# Patient Record
Sex: MALE | URBAN - METROPOLITAN AREA
[De-identification: names, ages, dates, MRNs, and addresses within clinical notes are randomized per-mention and may not be internally consistent; named-entity substitution may affect disease eponyms.]

---

## 2019-08-05 ENCOUNTER — TELEPHONE (OUTPATIENT)
Dept: GASTROENTEROLOGY | Facility: CLINIC | Age: 54
End: 2019-08-05

## 2025-04-11 ENCOUNTER — EVALUATION (OUTPATIENT)
Dept: PHYSICAL THERAPY | Facility: REHABILITATION | Age: 60
End: 2025-04-11
Payer: COMMERCIAL

## 2025-04-11 DIAGNOSIS — M25.561 ACUTE PAIN OF RIGHT KNEE: Primary | ICD-10-CM

## 2025-04-11 DIAGNOSIS — Z96.651 S/P TOTAL KNEE ARTHROPLASTY, RIGHT: ICD-10-CM

## 2025-04-11 PROCEDURE — 97110 THERAPEUTIC EXERCISES: CPT | Performed by: PHYSICAL THERAPIST

## 2025-04-11 PROCEDURE — 97161 PT EVAL LOW COMPLEX 20 MIN: CPT | Performed by: PHYSICAL THERAPIST

## 2025-04-11 NOTE — PROGRESS NOTES
PT Evaluation     Today's date: 2025  Patient name: Senthil Baker  : 1965  MRN: [id number]  Referring provider: Lc Gauthier DO  Dx:   Encounter Diagnosis     ICD-10-CM    1. Acute pain of right knee  M25.561       2. S/P total knee arthroplasty, right  Z96.651           Start Time: 0900  Stop Time: 0940  Total time in clinic (min): 40 minutes    Assessment  Impairments: abnormal muscle firing, abnormal or restricted ROM, abnormal movement, activity intolerance, impaired physical strength, pain with function, poor body mechanics and activity limitations    Assessment details: Patient is a 59 y.o. male presenting s/p R TKA with date of surgery 25. Resulting postoperative impairments include R knee AROM/PROM deficits, R knee strength deficits, and gait dysfunction. As a result of impairments patient experiences limitations with functional/daily activities including ambulating with SPC, longer distance ambulation, walking dog, lifting, and step to step stair navigation. Patient has the above listed impairments and will benefit from skilled PT to improve deficits to return to prior level of function.       Prognosis: good    Goals  Impairment Goals: 4-6 weeks  - Patient to decrease pain to 0/10  - Patient to improve knee PROM to WFL  - Patient to increase knee strength to 4/5 throughout  - Patient to increase hip strength to 4/5 throughout    Functional Goals: by discharge  - Patient to discharge to independent [facility]  - Patient to improve subjective functional level to 85%  - Patient to improve knee AROM to WFL  - Patient to ambulate in home and community without increased pain or difficulty   - Patient to ascend and descend stairs without increased pain or difficulty  - Patient to complete sit to stand transfers without increased pain or difficulty    Plan  Patient would benefit from: skilled physical therapy  Planned modality interventions: cryotherapy, TENS and thermotherapy: hydrocollator  packs    Planned therapy interventions: flexibility, home exercise program, joint mobilization, manual therapy, neuromuscular re-education, patient education, strengthening, stretching, therapeutic activities, therapeutic exercise and functional ROM exercises    Frequency: 2x week  Duration in weeks: 8  Treatment plan discussed with: patient        Subjective Evaluation    History of Present Illness  Mechanism of injury: HISTORY OF PRESENT ILLNESS: Patient presents s/p R TKA 25. He was recently discharged from home health. He had an ED visit one week after surgery due to ketoacidosis and PE and has been on Lovenox since.   PRIOR TREATMENT: home health  AGGRAVATING FACTORS: twisting  EASING FACTORS: medications  WORK:  (office work)  FUNCTIONAL LIMITATIONS: ambulating with SPC, longer distance ambulation, walking dog, lifting, and step to step stair navigation  SUBJECTIVE FUNCTIONAL LEVEL: 65%  PATIENT GOAL: to get back to normal  Pain  Current pain rating: 3  At best pain rating: 3  At worst pain ratin  Location: R knee          Objective     Active Range of Motion   Left Knee   Flexion: 145 degrees   Extension: 0 degrees     Right Knee   Flexion: 80 degrees   Extension: -12 degrees     Strength/Myotome Testing     Lumbar   Left   Normal strength    Right Hip   Planes of Motion   Flexion: 3+    Right Knee   Flexion: 3- (3+ within ROM)  Extension: 3- (3+ within ROM)  Quadriceps contraction: fair    Right Ankle/Foot   Dorsiflexion: 5    Additional Strength Details  Extension lag with SLR      Flowsheet Rows      Flowsheet Row Most Recent Value   PT/OT G-Codes    Current Score 54   Projected Score 78               Diagnosis: s/p R TKA 25   Precautions: DM, asthma, pacemaker, PE   Primary impairments: R knee AROM/PROM deficits, R knee strength deficits, and gait dysfunction   *asterisks by exercise = given for HEP           Manuals        R knee PROM and tibiofemoral mobilizations       "                                  There Ex        Rec bike        Heel slides *  5\" x 10       Gastroc strap stretch        Supine knee ext prop stretch                                                Neuro Re-Ed        Quad sets *  5\" x 10       SAQ  NV       Supine SLR   X 10       Bridges        S/L hip abd        Prone hip ext        Heel/toe raises        Mini squats        Band TKE        U/L leg press        Fwd/lat step ups        Sidestepping and waddle walks                        Re-evaluation             Ther Act/Gait                                         Modalities             CP prn.                                             "

## 2025-04-11 NOTE — HOME EXERCISE EDUCATION
Program_ID:[id number]   Access Code: [email or web address]  URL: [email or web address]  Date: 04-  Prepared By: Alejandro Clarke    Program Notes      Exercises      - Supine Quad Set - 3 x daily -  x weekly -  sets - 10 reps - 5 hold      - Supine Heel Slide with Strap - 3 x daily -  x weekly -  sets - 10 reps - 5 hold

## 2025-04-11 NOTE — LETTER
2025    Lc Gauthier DO  [address] [facility] [address]    Patient: Senthil Baker   YOB: 1965   Date of Visit: 2025     Encounter Diagnosis     ICD-10-CM    1. Acute pain of right knee  M25.561       2. S/P total knee arthroplasty, right  Z96.651           Dear Dr. Lc Gauthier, DO:    Thank you for your recent referral of Senthil Baker. Please review the attached evaluation summary from Senthil's recent visit.     Please verify that you agree with the plan of care by signing the attached order.     If you have any questions or concerns, please do not hesitate to call.     I sincerely appreciate the opportunity to share in the care of one of your patients and hope to have another opportunity to work with you in the near future.       Sincerely,    Alejandro Clarke, PT      Referring Provider:      I certify that I have read the below Plan of Care and certify the need for these services furnished under this plan of treatment while under my care.                    Lc Gauthier DO  [address] [facility] [address]  Via Fax: [phone]          PT Evaluation     Today's date: 2025  Patient name: Senthil Baker  : 1965  MRN: [id number]  Referring provider: Lc Gauthier DO  Dx:   Encounter Diagnosis     ICD-10-CM    1. Acute pain of right knee  M25.561       2. S/P total knee arthroplasty, right  Z96.651           Start Time: 0900  Stop Time: 0940  Total time in clinic (min): 40 minutes    Assessment  Impairments: abnormal muscle firing, abnormal or restricted ROM, abnormal movement, activity intolerance, impaired physical strength, pain with function, poor body mechanics and activity limitations    Assessment details: Patient is a 59 y.o. male presenting s/p R TKA with date of surgery 25. Resulting postoperative impairments include R knee AROM/PROM deficits, R knee strength deficits, and gait dysfunction. As a result of impairments  patient experiences limitations with functional/daily activities including ambulating with SPC, longer distance ambulation, walking dog, lifting, and step to step stair navigation. Patient has the above listed impairments and will benefit from skilled PT to improve deficits to return to prior level of function.       Prognosis: good    Goals  Impairment Goals: 4-6 weeks  - Patient to decrease pain to 0/10  - Patient to improve knee PROM to WFL  - Patient to increase knee strength to 4/5 throughout  - Patient to increase hip strength to 4/5 throughout    Functional Goals: by discharge  - Patient to discharge to independent [facility]  - Patient to improve subjective functional level to 85%  - Patient to improve knee AROM to WFL  - Patient to ambulate in home and community without increased pain or difficulty   - Patient to ascend and descend stairs without increased pain or difficulty  - Patient to complete sit to stand transfers without increased pain or difficulty    Plan  Patient would benefit from: skilled physical therapy  Planned modality interventions: cryotherapy, TENS and thermotherapy: hydrocollator packs    Planned therapy interventions: flexibility, home exercise program, joint mobilization, manual therapy, neuromuscular re-education, patient education, strengthening, stretching, therapeutic activities, therapeutic exercise and functional ROM exercises    Frequency: 2x week  Duration in weeks: 8  Treatment plan discussed with: patient        Subjective Evaluation    History of Present Illness  Mechanism of injury: HISTORY OF PRESENT ILLNESS: Patient presents s/p R TKA 2/19/25. He was recently discharged from home health. He had an ED visit one week after surgery due to ketoacidosis and PE and has been on Lovenox since.   PRIOR TREATMENT: home health  AGGRAVATING FACTORS: twisting  EASING FACTORS: medications  WORK:  (office work)  FUNCTIONAL LIMITATIONS: ambulating with SPC, longer distance  "ambulation, walking dog, lifting, and step to step stair navigation  SUBJECTIVE FUNCTIONAL LEVEL: 65%  PATIENT GOAL: to get back to normal  Pain  Current pain rating: 3  At best pain rating: 3  At worst pain ratin  Location: R knee          Objective     Active Range of Motion   Left Knee   Flexion: 145 degrees   Extension: 0 degrees     Right Knee   Flexion: 80 degrees   Extension: -12 degrees     Strength/Myotome Testing     Lumbar   Left   Normal strength    Right Hip   Planes of Motion   Flexion: 3+    Right Knee   Flexion: 3- (3+ within ROM)  Extension: 3- (3+ within ROM)  Quadriceps contraction: fair    Right Ankle/Foot   Dorsiflexion: 5    Additional Strength Details  Extension lag with SLR      Flowsheet Rows      Flowsheet Row Most Recent Value   PT/OT G-Codes    Current Score 54   Projected Score 78               Diagnosis: s/p R TKA 25   Precautions: DM, asthma, pacemaker, PE   Primary impairments: R knee AROM/PROM deficits, R knee strength deficits, and gait dysfunction   *asterisks by exercise = given for HEP           Manuals        R knee PROM and tibiofemoral mobilizations                                        There Ex        Rec bike        Heel slides *  5\" x 10       Gastroc strap stretch        Supine knee ext prop stretch                                                Neuro Re-Ed        Quad sets *  5\" x 10       SAQ  NV       Supine SLR   X 10       Bridges        S/L hip abd        Prone hip ext        Heel/toe raises        Mini squats        Band TKE        U/L leg press        Fwd/lat step ups        Sidestepping and waddle walks                        Re-evaluation             Ther Act/Gait                                         Modalities             CP prn.                                                             "

## 2025-04-11 NOTE — HOME EXERCISE EDUCATION
Program_ID:[id number]   Access Code: [email or web address]  URL: [email or web address]  Date: 04-  Prepared By: Alejandro Clarke    Program Notes      Exercises      - Supine Quad Set - 3 x daily -  x weekly -  sets - 10 reps - 5 hold      - Supine Heel Slide with Strap - 3 x daily -  x weekly -  sets - 10 reps - 5 hold      - Active Straight Leg Raise with Quad Set - 1 x daily -  x weekly -  sets - 10 reps

## 2025-04-11 NOTE — HOME EXERCISE EDUCATION
Program_ID:[id number]   Access Code: [email or web address]  URL: [email or web address]  Date: 04-  Prepared By: Alejandro Clarke    Program Notes      Exercises      - Supine Quad Set - 3 x daily -  x weekly -  sets - 10 reps - 5 hold      - Supine Heel Slide with Strap - 3 x daily -  x weekly -  sets - 10 reps - 5 hold

## 2025-04-11 NOTE — HOME EXERCISE EDUCATION
Program_ID:[id number]   Access Code: [email or web address]  URL: [email or web address]  Date: 04-  Prepared By: Alejandro Clarke    Program Notes      Exercises      - Supine Quad Set - 3 x daily -  x weekly -  sets - 10 reps - 5 hold      - Supine Heel Slide with Strap - 3 x daily -  x weekly -  sets - 10 reps - 5 hold      - Active Straight Leg Raise with Quad Set - 1 x daily -  x weekly -  sets - 10 reps

## 2025-04-16 ENCOUNTER — OFFICE VISIT (OUTPATIENT)
Dept: PHYSICAL THERAPY | Facility: REHABILITATION | Age: 60
End: 2025-04-16
Payer: COMMERCIAL

## 2025-04-16 DIAGNOSIS — M25.561 ACUTE PAIN OF RIGHT KNEE: Primary | ICD-10-CM

## 2025-04-16 DIAGNOSIS — Z96.651 S/P TOTAL KNEE ARTHROPLASTY, RIGHT: ICD-10-CM

## 2025-04-16 PROCEDURE — 97112 NEUROMUSCULAR REEDUCATION: CPT | Performed by: PHYSICAL THERAPIST

## 2025-04-16 PROCEDURE — 97110 THERAPEUTIC EXERCISES: CPT | Performed by: PHYSICAL THERAPIST

## 2025-04-16 PROCEDURE — 97140 MANUAL THERAPY 1/> REGIONS: CPT | Performed by: PHYSICAL THERAPIST

## 2025-04-18 ENCOUNTER — OFFICE VISIT (OUTPATIENT)
Dept: PHYSICAL THERAPY | Facility: REHABILITATION | Age: 60
End: 2025-04-18
Payer: COMMERCIAL

## 2025-04-18 DIAGNOSIS — Z96.651 S/P TOTAL KNEE ARTHROPLASTY, RIGHT: ICD-10-CM

## 2025-04-18 DIAGNOSIS — M25.561 ACUTE PAIN OF RIGHT KNEE: Primary | ICD-10-CM

## 2025-04-18 PROCEDURE — 97112 NEUROMUSCULAR REEDUCATION: CPT | Performed by: PHYSICAL THERAPIST

## 2025-04-18 PROCEDURE — 97140 MANUAL THERAPY 1/> REGIONS: CPT | Performed by: PHYSICAL THERAPIST

## 2025-04-18 PROCEDURE — 97110 THERAPEUTIC EXERCISES: CPT | Performed by: PHYSICAL THERAPIST

## 2025-04-18 NOTE — PROGRESS NOTES
"Daily Note     Today's date: 2025  Patient name: Senthil Baker  : 1965  MRN: [id number]  Referring provider: Lc Gauthier DO  Dx:   Encounter Diagnosis     ICD-10-CM    1. Acute pain of right knee  M25.561       2. S/P total knee arthroplasty, right  Z96.651           Start Time: 1315  Stop Time: 1410  Total time in clinic (min): 55 minutes    Subjective: Patient reports he felt good after last visit however is sore on arrival from walking his dog about 4 blocks yesterday      Objective: See treatment diary below      Assessment: Tolerated treatment well. Positive response to manual therapy with improving range throughout duration. Deficits remain in knee flexion and extension ROM however improving with interventions. Good quadriceps recruitment during mini squats and standing terminal knee extensions with band resistance. Patient demonstrated fatigue post treatment, exhibited good technique with therapeutic exercises, and would benefit from continued PT      Plan: Continue per plan of care.        Diagnosis: s/p R TKA 25   Precautions: DM, asthma, pacemaker, PE   Primary impairments: R knee AROM/PROM deficits, R knee strength deficits, and gait dysfunction   *asterisks by exercise = given for HEP         Manuals        R knee PROM and tibiofemoral mobilizations   15'  15'                                     There Ex        Rec bike   1/2 revs x 8'  1/2 revs x 8'     Heel slides *  5\" x 10  5\" x 10  5\" x 10     Gastroc strap stretch        Supine knee ext prop stretch                                                Neuro Re-Ed        Quad sets *  5\" x 10  5\" x 10      SAQ  NV  5\" x 15  5\" x 15     Supine SLR   X 10       Bridges        S/L hip abd        Prone hip ext        Heel/toe raises   X 15 ea  X 15 ea     Mini squats   X 15  X 15     Band TKE   Blue 5\" x 10  Blue 5\" x 10     U/L leg press        Fwd/lat step ups        Sidestepping and waddle walks                      "   Re-evaluation             Ther Act/Gait                                         Modalities             CP prn.   10'  10'

## 2025-04-22 ENCOUNTER — OFFICE VISIT (OUTPATIENT)
Dept: PHYSICAL THERAPY | Facility: REHABILITATION | Age: 60
End: 2025-04-22
Attending: ORTHOPAEDIC SURGERY
Payer: COMMERCIAL

## 2025-04-22 DIAGNOSIS — M25.561 ACUTE PAIN OF RIGHT KNEE: Primary | ICD-10-CM

## 2025-04-22 DIAGNOSIS — Z96.651 S/P TOTAL KNEE ARTHROPLASTY, RIGHT: ICD-10-CM

## 2025-04-22 PROCEDURE — 97112 NEUROMUSCULAR REEDUCATION: CPT | Performed by: PHYSICAL THERAPIST

## 2025-04-22 PROCEDURE — 97140 MANUAL THERAPY 1/> REGIONS: CPT | Performed by: PHYSICAL THERAPIST

## 2025-04-22 PROCEDURE — 97110 THERAPEUTIC EXERCISES: CPT | Performed by: PHYSICAL THERAPIST

## 2025-04-22 NOTE — PROGRESS NOTES
"Daily Note     Today's date: 2025  Patient name: Senthil Baker  : 1965  MRN: [id number]  Referring provider: Lc Gauthier DO  Dx:   Encounter Diagnosis     ICD-10-CM    1. Acute pain of right knee  M25.561       2. S/P total knee arthroplasty, right  Z96.651           Start Time: 0948  Stop Time: 1040  Total time in clinic (min): 52 minutes    Subjective: Patient reports he felt good after last visit but has swelling and stiffness on arrival      Objective: See treatment diary below      Assessment: Tolerated treatment well. Modified squats to sit to stand for improved mechanics with cueing to maintain symmetrical weight distribution. Most significant ROM limitations into knee flexion. Patient demonstrated fatigue post treatment, exhibited good technique with therapeutic exercises, and would benefit from continued PT      Plan: Continue per plan of care.        Diagnosis: s/p R TKA 25   Precautions: DM, asthma, pacemaker, PE   Primary impairments: R knee AROM/PROM deficits, R knee strength deficits, and gait dysfunction   *asterisks by exercise = given for HEP        Manuals        R knee PROM and tibiofemoral mobilizations   15'  15'  15'                                    There Ex        Rec bike   1/2 revs x 8'  1/2 revs x 8'  1/2 revs x 8'    Heel slides *  5\" x 10  5\" x 10  5\" x 10  5\" x 10    Gastroc strap stretch        Supine knee ext prop stretch                                                Neuro Re-Ed        Quad sets *  5\" x 10  5\" x 10      SAQ  NV  5\" x 15  5\" x 15  5\" x 10    Supine SLR   X 10       Bridges        S/L hip abd        Prone hip ext        Heel/toe raises   X 15 ea  X 15 ea  X 15 ea    Sit to stand     Foam x 10    Band TKE   Blue 5\" x 10  Blue 5\" x 10  Blue 5\" x 10    U/L leg press        Fwd/lat step ups     1R x 10 ea    Sidestepping and waddle walks                        Re-evaluation             Ther Act/Gait                                 "         Modalities             CP prn.   10'  10'  10'

## 2025-04-24 ENCOUNTER — OFFICE VISIT (OUTPATIENT)
Dept: PHYSICAL THERAPY | Facility: REHABILITATION | Age: 60
End: 2025-04-24
Attending: ORTHOPAEDIC SURGERY
Payer: COMMERCIAL

## 2025-04-24 DIAGNOSIS — M25.561 ACUTE PAIN OF RIGHT KNEE: Primary | ICD-10-CM

## 2025-04-24 DIAGNOSIS — Z96.651 S/P TOTAL KNEE ARTHROPLASTY, RIGHT: ICD-10-CM

## 2025-04-24 PROCEDURE — 97140 MANUAL THERAPY 1/> REGIONS: CPT | Performed by: PHYSICAL THERAPIST

## 2025-04-24 PROCEDURE — 97110 THERAPEUTIC EXERCISES: CPT | Performed by: PHYSICAL THERAPIST

## 2025-04-24 PROCEDURE — 97112 NEUROMUSCULAR REEDUCATION: CPT | Performed by: PHYSICAL THERAPIST

## 2025-04-24 NOTE — PROGRESS NOTES
"Daily Note     Today's date: 2025  Patient name: Senthil Baker  : 1965  MRN: [id number]  Referring provider: Lc Gauthier DO  Dx:   Encounter Diagnosis     ICD-10-CM    1. Acute pain of right knee  M25.561       2. S/P total knee arthroplasty, right  Z96.651           Start Time: 0900  Stop Time: 1000  Total time in clinic (min): 60 minutes    Subjective: Patient reports he cut the grass on Tuesday which took him 4.5 hours and he was very tired the next day      Objective: See treatment diary below      Assessment: Tolerated treatment well. Initiated terminal leg press into ball and increased hold time with short-arc quadriceps. Improving knee ROM with interventions however most significant limitations remain into flexion. Cueing to minimize hip compensation during band-resisted terminal knee extension in standing. Patient demonstrated fatigue post treatment, exhibited good technique with therapeutic exercises, and would benefit from continued PT      Plan: Continue per plan of care.        Diagnosis: s/p R TKA 25   Precautions: DM, asthma, pacemaker, PE   Primary impairments: R knee AROM/PROM deficits, R knee strength deficits, and gait dysfunction   *asterisks by exercise = given for HEP       Manuals        R knee PROM and tibiofemoral mobilizations   15'  15'  15'  15'                                   There Ex        Rec bike   1/2 revs x 8'  1/2 revs x 8'  1/2 revs x 8'  1/2 revs x 8'   Heel slides *  5\" x 10  5\" x 10  5\" x 10  5\" x 10  5\" x 10   Gastroc strap stretch        Supine knee ext prop stretch                                                Neuro Re-Ed        Quad sets *  5\" x 10  5\" x 10      SAQ  NV  5\" x 15  5\" x 15  5\" x 10  10\" x 10   Supine SLR   X 10       Bridges        S/L hip abd      X 15   Prone hip ext        Sit to stand     Foam x 10  Foam x 10   Band TKE   Blue 5\" x 10  Blue 5\" x 10  Blue 5\" x 10  Blue 5\" x 10   U/L leg press        Ball " "terminal leg press      5\" x 10   Fwd/lat step ups     1R x 10 ea  1R x 10 ea   Sidestepping and waddle walks                        Re-evaluation             Ther Act/Gait                                         Modalities             CP prn.   10'  10'  10'  10'                                              "

## 2025-04-29 ENCOUNTER — OFFICE VISIT (OUTPATIENT)
Dept: PHYSICAL THERAPY | Facility: REHABILITATION | Age: 60
End: 2025-04-29
Attending: ORTHOPAEDIC SURGERY
Payer: COMMERCIAL

## 2025-04-29 DIAGNOSIS — Z96.651 S/P TOTAL KNEE ARTHROPLASTY, RIGHT: ICD-10-CM

## 2025-04-29 DIAGNOSIS — M25.561 ACUTE PAIN OF RIGHT KNEE: Primary | ICD-10-CM

## 2025-04-29 PROCEDURE — 97140 MANUAL THERAPY 1/> REGIONS: CPT | Performed by: PHYSICAL THERAPIST

## 2025-04-29 PROCEDURE — 97112 NEUROMUSCULAR REEDUCATION: CPT | Performed by: PHYSICAL THERAPIST

## 2025-04-29 PROCEDURE — 97110 THERAPEUTIC EXERCISES: CPT | Performed by: PHYSICAL THERAPIST

## 2025-04-29 NOTE — PROGRESS NOTES
"Daily Note     Today's date: 2025  Patient name: Senthil Baker  : 1965  MRN: [id number]  Referring provider: Lc Gauthier DO  Dx:   Encounter Diagnosis     ICD-10-CM    1. Acute pain of right knee  M25.561       2. S/P total knee arthroplasty, right  Z96.651           Start Time: 0900  Stop Time: 0955  Total time in clinic (min): 55 minutes    Subjective: Patient reports he is stiff on arrival and he felt okay after last visit      Objective: See treatment diary below      Assessment: Tolerated treatment well. Continued mobility deficits into flexion and extension with more significant limitations into flexion. Initiated unilateral leg press with no adverse responses. Symmetrical weight distribution observed during sit to stand. Patient demonstrated fatigue post treatment, exhibited good technique with therapeutic exercises, and would benefit from continued PT      Plan: Continue per plan of care.        Diagnosis: s/p R TKA 25   Precautions: DM, asthma, pacemaker, PE   Primary impairments: R knee AROM/PROM deficits, R knee strength deficits, and gait dysfunction   *asterisks by exercise = given for HEP       Manuals        R knee PROM and tibiofemoral mobilizations  15'  15'  15'  15'  15'   Prone knee flexion  3'                               There Ex        Rec bike  1/2 revs x 8'  1/2 revs x 8'  1/2 revs x 8'  1/2 revs x 8'  1/2 revs x 8'   Heel slides *  5\" x 10  5\" x 10  5\" x 10  5\" x 10  5\" x 10   Gastroc strap stretch        Supine knee ext prop stretch                                                Neuro Re-Ed        Quad sets *   5\" x 10      SAQ   5\" x 15  5\" x 15  5\" x 10  10\" x 10   Supine SLR         Bridges        S/L hip abd      X 15   Prone hip ext        Sit to stand  Foam x 10    Foam x 10  Foam x 10   Band TKE  D/C  Blue 5\" x 10  Blue 5\" x 10  Blue 5\" x 10  Blue 5\" x 10   U/L leg press  45 lbs x 15       Ball terminal leg press  5\" x 15     5\" x 10 " I reviewed the H&P, I examined the patient, and there are no changes in the patient's condition.   Fwd/lat step ups  1R x 10 ea    1R x 10 ea  1R x 10 ea   Sidestepping and waddle walks                        Re-evaluation            Ther Act/Gait                                      Modalities            CP prn.  10'  10'  10'  10'  10'                                               Patient

## 2025-05-01 ENCOUNTER — OFFICE VISIT (OUTPATIENT)
Dept: PHYSICAL THERAPY | Facility: REHABILITATION | Age: 60
End: 2025-05-01
Attending: ORTHOPAEDIC SURGERY
Payer: COMMERCIAL

## 2025-05-01 DIAGNOSIS — Z96.651 S/P TOTAL KNEE ARTHROPLASTY, RIGHT: ICD-10-CM

## 2025-05-01 DIAGNOSIS — M25.561 ACUTE PAIN OF RIGHT KNEE: Primary | ICD-10-CM

## 2025-05-01 PROCEDURE — 97110 THERAPEUTIC EXERCISES: CPT | Performed by: PHYSICAL THERAPIST

## 2025-05-01 PROCEDURE — 97112 NEUROMUSCULAR REEDUCATION: CPT | Performed by: PHYSICAL THERAPIST

## 2025-05-01 PROCEDURE — 97140 MANUAL THERAPY 1/> REGIONS: CPT | Performed by: PHYSICAL THERAPIST

## 2025-05-01 NOTE — PROGRESS NOTES
"Daily Note     Today's date: 2025  Patient name: Senthil Baker  : 1965  MRN: [id number]  Referring provider: Lc Gauthier DO  Dx:   Encounter Diagnosis     ICD-10-CM    1. Acute pain of right knee  M25.561       2. S/P total knee arthroplasty, right  Z96.651           Start Time: 0815  Stop Time: 0910  Total time in clinic (min): 55 minutes    Subjective: Patient walked his dog 3 blocks yesterday. He felt okay but walked slowly and he wishes his knee would loosen up      Objective: See treatment diary below      Assessment: Tolerated treatment well. Continued deficits in knee flexion however gradually improving with interventions. Increasing ROM available during terminal leg press into ball. Patient demonstrated fatigue post treatment, exhibited good technique with therapeutic exercises, and would benefit from continued PT      Plan: Continue per plan of care.        Diagnosis: s/p R TKA 25   Precautions: DM, asthma, pacemaker, PE   Primary impairments: R knee AROM/PROM deficits, R knee strength deficits, and gait dysfunction   *asterisks by exercise = given for HEP       Manuals        R knee PROM and tibiofemoral mobilizations  15'  15'  15'  15'  15'   Prone knee flexion  3'  3'                              There Ex        Rec bike  1/2 revs x 8'  1/2 revs x 8'  1/2 revs x 8'  1/2 revs x 8'  1/2 revs x 8'   Heel slides *  5\" x 10   5\" x 10  5\" x 10  5\" x 10   Gastroc strap stretch        Supine knee ext prop stretch                                                Neuro Re-Ed        Quad sets *        SAQ    5\" x 15  5\" x 10  10\" x 10   Supine SLR         Bridges        S/L hip abd      X 15   Prone hip ext        Sit to stand  Foam x 10  Foam x 10   Foam x 10  Foam x 10   U/L leg press  45 lbs x 15  45 lbs x 15      Ball terminal leg press  5\" x 15  5\" x 15    5\" x 10   Fwd/lat step ups  1R x 10 ea  1R x 12 ea   1R x 10 ea  1R x 10 ea   Sidestepping and waddle walks      "                   Re-evaluation           Ther Act/Gait                                   Modalities           CP prn.  10'  10'  10'  10'  10'

## 2025-05-05 ENCOUNTER — OFFICE VISIT (OUTPATIENT)
Dept: PHYSICAL THERAPY | Facility: REHABILITATION | Age: 60
End: 2025-05-05
Attending: ORTHOPAEDIC SURGERY
Payer: COMMERCIAL

## 2025-05-05 DIAGNOSIS — Z96.651 S/P TOTAL KNEE ARTHROPLASTY, RIGHT: ICD-10-CM

## 2025-05-05 DIAGNOSIS — M25.561 ACUTE PAIN OF RIGHT KNEE: Primary | ICD-10-CM

## 2025-05-05 PROCEDURE — 97110 THERAPEUTIC EXERCISES: CPT | Performed by: PHYSICAL THERAPIST

## 2025-05-05 PROCEDURE — 97112 NEUROMUSCULAR REEDUCATION: CPT | Performed by: PHYSICAL THERAPIST

## 2025-05-05 PROCEDURE — 97140 MANUAL THERAPY 1/> REGIONS: CPT | Performed by: PHYSICAL THERAPIST

## 2025-05-05 NOTE — PROGRESS NOTES
"Daily Note     Today's date: 2025  Patient name: Senthil Baker  : 1965  MRN: [id number]  Referring provider: Lc Gauthier DO  Dx:   Encounter Diagnosis     ICD-10-CM    1. Acute pain of right knee  M25.561       2. S/P total knee arthroplasty, right  Z96.651           Start Time: 0815  Stop Time: 0910  Total time in clinic (min): 55 minutes    Subjective: Patient reports he was able to walk to treatment today which is about 10 minutes      Objective: See treatment diary below      Assessment: Tolerated treatment well. Increased resistance with unilateral leg press with no adverse responses. Continued limitations in knee ROM most notably into flexion. Patient demonstrated fatigue post treatment, exhibited good technique with therapeutic exercises, and would benefit from continued PT      Plan: Continue per plan of care.        Diagnosis: s/p R TKA 25   Precautions: DM, asthma, pacemaker, PE   Primary impairments: R knee AROM/PROM deficits, R knee strength deficits, and gait dysfunction   *asterisks by exercise = given for HEP       Manuals        R knee PROM and tibiofemoral mobilizations  15'  15'  15'  15'  15'   Prone knee flexion  3'  3'  3'                             There Ex        Rec bike  1/2 revs x 8'  1/2 revs x 8'  1/2 revs x 8'  1/2 revs x 8'  1/2 revs x 8'   Heel slides *  5\" x 10    5\" x 10  5\" x 10   Gastroc strap stretch        Supine knee ext prop stretch                                                Neuro Re-Ed        Quad sets *        SAQ     5\" x 10  10\" x 10   Supine SLR         Bridges        S/L hip abd      X 15   Prone hip ext        Sit to stand  Foam x 10  Foam x 10  Foam x 15  Foam x 10  Foam x 10   U/L leg press  45 lbs x 15  45 lbs x 15  55 lbs x 15     Ball terminal leg press  5\" x 15  5\" x 15  5\" x 15   5\" x 10   Fwd/lat step ups  1R x 10 ea  1R x 12 ea  1R x 15 ea  1R x 10 ea  1R x 10 ea   Sidestepping and waddle walks                     "    Re-evaluation           Ther Act/Gait                                   Modalities           CP prn.  10'  10'  10'  10'  10'

## 2025-05-07 ENCOUNTER — EVALUATION (OUTPATIENT)
Dept: PHYSICAL THERAPY | Facility: REHABILITATION | Age: 60
End: 2025-05-07
Attending: ORTHOPAEDIC SURGERY
Payer: COMMERCIAL

## 2025-05-07 DIAGNOSIS — M25.561 ACUTE PAIN OF RIGHT KNEE: Primary | ICD-10-CM

## 2025-05-07 DIAGNOSIS — Z96.651 S/P TOTAL KNEE ARTHROPLASTY, RIGHT: ICD-10-CM

## 2025-05-07 PROCEDURE — 97112 NEUROMUSCULAR REEDUCATION: CPT | Performed by: PHYSICAL THERAPIST

## 2025-05-07 PROCEDURE — 97110 THERAPEUTIC EXERCISES: CPT | Performed by: PHYSICAL THERAPIST

## 2025-05-07 NOTE — PROGRESS NOTES
PT Re-Evaluation     Today's date: 2025  Patient name: Senthil Baker  : 1965  MRN: [id number]  Referring provider: Lc Gauthier DO  Dx:   Encounter Diagnosis     ICD-10-CM    1. Acute pain of right knee  M25.561       2. S/P total knee arthroplasty, right  Z96.651           Start Time: 0815  Stop Time: 09  Total time in clinic (min): 50 minutes    Assessment  Impairments: abnormal muscle firing, abnormal or restricted ROM, abnormal movement, activity intolerance, impaired physical strength, pain with function, poor body mechanics and activity limitations    Assessment details: Patient is a 59 y.o. male presenting s/p R TKA with date of surgery 25. Patient exhibits fair progress toward objective and functional goals at time of reexamination. Patient exhibits improvements with ambulating without AD in home and short distances, walking dog, knee ROM, knee strength, and weightbearing tolerance since initiating PT however continues to have limitations compared to prior level of function. Remaining functional limitations include ambulating with SPC for longer distances, longer distance ambulation, lifting, and step to step stair navigation. As a result of impairments patient has continued limitations with daily and functional activities and would benefit from continued skilled PT interventions to address these impairments in order to maximize function.          Prognosis: good    Goals  Impairment Goals: 4-6 weeks  - Patient to decrease pain to 0/10 - MET  - Patient to improve knee PROM to WFL - PROGRESSING  - Patient to increase knee strength to 4/5 throughout - PROGRESSING  - Patient to increase hip strength to 4/5 throughout - PROGRESSING    Functional Goals: by discharge  - Patient to discharge to independent [facility] - PROGRESSING  - Patient to improve subjective functional level to 85% - PROGRESSING  - Patient to improve knee AROM to WFL - PROGRESSING  - Patient to ambulate in home and community  without increased pain or difficulty - PROGRESSING  - Patient to ascend and descend stairs without increased pain or difficulty - PROGRESSING  - Patient to complete sit to stand transfers without increased pain or difficulty - PROGRESSING    Plan  Patient would benefit from: skilled physical therapy  Planned modality interventions: cryotherapy, TENS and thermotherapy: hydrocollator packs    Planned therapy interventions: flexibility, home exercise program, joint mobilization, manual therapy, neuromuscular re-education, patient education, strengthening, stretching, therapeutic activities, therapeutic exercise and functional ROM exercises    Frequency: 2x week  Duration in weeks: 8  Treatment plan discussed with: patient        Subjective Evaluation    History of Present Illness  Mechanism of injury: HISTORY OF PRESENT ILLNESS: Patient presents s/p R TKA 25. He reports he is improving slowly with interventions. He continues to have tightness into knee flexion. He is getting more comfortable with seated and lying positioning and has more confidence with weightbearing. He is ambulating without AD in home and short distances and is ambulating with SPC for longer distances.   PRIOR TREATMENT: home health  AGGRAVATING FACTORS: general soreness  EASING FACTORS: medications  WORK:  (office work)  FUNCTIONAL LIMITATIONS: ambulating with SPC for longer distances, longer distance ambulation, lifting, and step to step stair navigation  IMPROVEMENTS: ambulating without AD in home and short distances, walking dog, knee ROM, knee strength, and weightbearing tolerance  SUBJECTIVE FUNCTIONAL LEVEL: 75%  PATIENT GOAL: to get back to normal  Pain  Current pain ratin  At best pain ratin  At worst pain ratin  Location: R knee          Objective     Active Range of Motion   Left Knee   Flexion: 145 degrees   Extension: 0 degrees     Right Knee   Flexion: 82 degrees   Extension: -5 degrees     Passive Range  "of Motion     Right Knee   Flexion: 82 degrees   Extension: -2 degrees     Strength/Myotome Testing     Lumbar   Left   Normal strength    Right Hip   Planes of Motion   Flexion: 4-    Right Knee   Flexion: 3- (4 within ROM)  Extension: 3- (4 within ROM)  Quadriceps contraction: fair    Right Ankle/Foot   Dorsiflexion: 5    Additional Strength Details  R SLR: 3+  L SLR: 4  Extension lag with SLR               Diagnosis: s/p R TKA 2/19/25   Precautions: DM, asthma, pacemaker, PE   Primary impairments: R knee AROM/PROM deficits, R knee strength deficits, and gait dysfunction   *asterisks by exercise = given for HEP    4/29 5/1 5/5 5/7 4/24   Manuals        R knee PROM and tibiofemoral mobilizations  15'  15'  15'   15'   Prone knee flexion  3'  3'  3'                             There Ex        Rec bike  1/2 revs x 8'  1/2 revs x 8'  1/2 revs x 8'  1/2 revs x 8'  1/2 revs x 8'   Heel slides *  5\" x 10     5\" x 10   Gastroc strap stretch        Supine knee ext prop stretch                                                Neuro Re-Ed        Quad sets *        SAQ      10\" x 10   Supine SLR         Bridges        S/L hip abd      X 15   Prone hip ext        Sit to stand  Foam x 10  Foam x 10  Foam x 15   Foam x 10   U/L leg press  45 lbs x 15  45 lbs x 15  55 lbs x 15     Ball terminal leg press  5\" x 15  5\" x 15  5\" x 15   5\" x 10   Fwd/lat step ups  1R x 10 ea  1R x 12 ea  1R x 15 ea   1R x 10 ea   Sidestepping and waddle walks                        Re-evaluation      CM     Ther Act/Gait                                Modalities           CP prn.  10'  10'  10'  10'  10'                                      "

## 2025-05-07 NOTE — LETTER
May 7, 2025    Lc Gauthier DO  [address] [facility] [address]    Patient: Senthil Baker   YOB: 1965   Date of Visit: 2025     Encounter Diagnosis     ICD-10-CM    1. Acute pain of right knee  M25.561       2. S/P total knee arthroplasty, right  Z96.651           Dear Dr. Lc Gauthier, DO:    Thank you for your recent referral of Senthil Baker. Please review the attached evaluation summary from Senthil's recent visit.     Please verify that you agree with the plan of care by signing the attached order.     If you have any questions or concerns, please do not hesitate to call.     I sincerely appreciate the opportunity to share in the care of one of your patients and hope to have another opportunity to work with you in the near future.       Sincerely,    Alejandro Clarke, PT      Referring Provider:      I certify that I have read the below Plan of Care and certify the need for these services furnished under this plan of treatment while under my care.                    Lc Gauthier DO  [address] [facility] [address]  Via Fax: [phone]          PT Re-Evaluation     Today's date: 2025  Patient name: Senthil Baker  : 1965  MRN: [id number]  Referring provider: Lc Gauthier DO  Dx:   Encounter Diagnosis     ICD-10-CM    1. Acute pain of right knee  M25.561       2. S/P total knee arthroplasty, right  Z96.651           Start Time: 0815  Stop Time: 09  Total time in clinic (min): 50 minutes    Assessment  Impairments: abnormal muscle firing, abnormal or restricted ROM, abnormal movement, activity intolerance, impaired physical strength, pain with function, poor body mechanics and activity limitations    Assessment details: Patient is a 59 y.o. male presenting s/p R TKA with date of surgery 25. Patient exhibits fair progress toward objective and functional goals at time of reexamination. Patient exhibits improvements with ambulating without AD  in home and short distances, walking dog, knee ROM, knee strength, and weightbearing tolerance since initiating PT however continues to have limitations compared to prior level of function. Remaining functional limitations include ambulating with SPC for longer distances, longer distance ambulation, lifting, and step to step stair navigation. As a result of impairments patient has continued limitations with daily and functional activities and would benefit from continued skilled PT interventions to address these impairments in order to maximize function.          Prognosis: good    Goals  Impairment Goals: 4-6 weeks  - Patient to decrease pain to 0/10 - MET  - Patient to improve knee PROM to WFL - PROGRESSING  - Patient to increase knee strength to 4/5 throughout - PROGRESSING  - Patient to increase hip strength to 4/5 throughout - PROGRESSING    Functional Goals: by discharge  - Patient to discharge to independent [facility] - PROGRESSING  - Patient to improve subjective functional level to 85% - PROGRESSING  - Patient to improve knee AROM to WFL - PROGRESSING  - Patient to ambulate in home and community without increased pain or difficulty - PROGRESSING  - Patient to ascend and descend stairs without increased pain or difficulty - PROGRESSING  - Patient to complete sit to stand transfers without increased pain or difficulty - PROGRESSING    Plan  Patient would benefit from: skilled physical therapy  Planned modality interventions: cryotherapy, TENS and thermotherapy: hydrocollator packs    Planned therapy interventions: flexibility, home exercise program, joint mobilization, manual therapy, neuromuscular re-education, patient education, strengthening, stretching, therapeutic activities, therapeutic exercise and functional ROM exercises    Frequency: 2x week  Duration in weeks: 8  Treatment plan discussed with: patient        Subjective Evaluation    History of Present Illness  Mechanism of injury: HISTORY OF PRESENT  ILLNESS: Patient presents s/p R TKA 25. He reports he is improving slowly with interventions. He continues to have tightness into knee flexion. He is getting more comfortable with seated and lying positioning and has more confidence with weightbearing. He is ambulating without AD in home and short distances and is ambulating with SPC for longer distances.   PRIOR TREATMENT: home health  AGGRAVATING FACTORS: general soreness  EASING FACTORS: medications  WORK:  (office work)  FUNCTIONAL LIMITATIONS: ambulating with SPC for longer distances, longer distance ambulation, lifting, and step to step stair navigation  IMPROVEMENTS: ambulating without AD in home and short distances, walking dog, knee ROM, knee strength, and weightbearing tolerance  SUBJECTIVE FUNCTIONAL LEVEL: 75%  PATIENT GOAL: to get back to normal  Pain  Current pain ratin  At best pain ratin  At worst pain ratin  Location: R knee          Objective     Active Range of Motion   Left Knee   Flexion: 145 degrees   Extension: 0 degrees     Right Knee   Flexion: 82 degrees   Extension: -5 degrees     Passive Range of Motion     Right Knee   Flexion: 82 degrees   Extension: -2 degrees     Strength/Myotome Testing     Lumbar   Left   Normal strength    Right Hip   Planes of Motion   Flexion: 4-    Right Knee   Flexion: 3- (4 within ROM)  Extension: 3- (4 within ROM)  Quadriceps contraction: fair    Right Ankle/Foot   Dorsiflexion: 5    Additional Strength Details  R SLR: 3+  L SLR: 4  Extension lag with SLR               Diagnosis: s/p R TKA 25   Precautions: DM, asthma, pacemaker, PE   Primary impairments: R knee AROM/PROM deficits, R knee strength deficits, and gait dysfunction   *asterisks by exercise = given for HEP       Manuals        R knee PROM and tibiofemoral mobilizations  15'  15'  15'   15'   Prone knee flexion  3'  3'  3'                             There Ex        Rec bike   revs x 8'   "1/2 revs x 8'  1/2 revs x 8'  1/2 revs x 8'  1/2 revs x 8'   Heel slides *  5\" x 10     5\" x 10   Gastroc strap stretch        Supine knee ext prop stretch                                                Neuro Re-Ed        Quad sets *        SAQ      10\" x 10   Supine SLR         Bridges        S/L hip abd      X 15   Prone hip ext        Sit to stand  Foam x 10  Foam x 10  Foam x 15   Foam x 10   U/L leg press  45 lbs x 15  45 lbs x 15  55 lbs x 15     Ball terminal leg press  5\" x 15  5\" x 15  5\" x 15   5\" x 10   Fwd/lat step ups  1R x 10 ea  1R x 12 ea  1R x 15 ea   1R x 10 ea   Sidestepping and waddle walks                        Re-evaluation      CM     Ther Act/Gait                                Modalities           CP prn.  10'  10'  10'  10'  10'                                                      "

## 2025-05-12 ENCOUNTER — OFFICE VISIT (OUTPATIENT)
Dept: PHYSICAL THERAPY | Facility: REHABILITATION | Age: 60
End: 2025-05-12
Attending: ORTHOPAEDIC SURGERY
Payer: COMMERCIAL

## 2025-05-12 DIAGNOSIS — Z96.651 S/P TOTAL KNEE ARTHROPLASTY, RIGHT: ICD-10-CM

## 2025-05-12 DIAGNOSIS — M25.561 ACUTE PAIN OF RIGHT KNEE: Primary | ICD-10-CM

## 2025-05-12 PROCEDURE — 97110 THERAPEUTIC EXERCISES: CPT

## 2025-05-12 PROCEDURE — 97140 MANUAL THERAPY 1/> REGIONS: CPT

## 2025-05-12 PROCEDURE — 97112 NEUROMUSCULAR REEDUCATION: CPT

## 2025-05-12 NOTE — PROGRESS NOTES
"Daily Note     Today's date: 2025  Patient name: Senthil Baker  : 1965  MRN: [id number]  Referring provider: Lc Gauthier DO  Dx:   Encounter Diagnosis     ICD-10-CM    1. Acute pain of right knee  M25.561       2. S/P total knee arthroplasty, right  Z96.651                      Subjective:   Patient reports that his knee feels very stiff this morning.  He notes mornings are still rough in regard to his motion in the morning.       Objective: See treatment diary below      Assessment: Patient tolerated treatment well. Patient performed ex and received manual therapy as noted.  Provided cues as needed to ensure good ex technique and benefit.  Patient presented with stiffness in both R knee flexion and extension which improved during the session.  Senthil reported decreased stiffness post treatment.  Patient would benefit from continued PT intervention to address deficits and attain set goals.       Plan: Continue per plan of care.        Diagnosis: s/p R TKA 25   Precautions: DM, asthma, pacemaker, PE   Primary impairments: R knee AROM/PROM deficits, R knee strength deficits, and gait dysfunction   *asterisks by exercise = given for HEP       Manuals        R knee PROM and tibiofemoral mobilizations  15'  15'  15'  15'   Prone knee flexion  3'  3'  3'  3'                           There Ex        Rec bike  1/2 revs x 8'  1/2 revs x 8'  1/2 revs x 8'  1/2 revs x 8'  1/2 revs x 8'   Heel slides *  5\" x 10       Gastroc strap stretch        Supine knee ext prop stretch                                                Neuro Re-Ed        Quad sets *        SAQ         Supine SLR         Bridges        S/L hip abd        Prone hip ext        Sit to stand  Foam x 10  Foam x 10  Foam x 15   Foam x 15   U/L leg press  45 lbs x 15  45 lbs x 15  55 lbs x 15  55lbs x15   Ball terminal leg press  5\" x 15  5\" x 15  5\" x 15   5\" x 15   Fwd/lat step ups  1R x 10 ea  1R x 12 ea  1R x 15 ea   1R " x 15 ea   Sidestepping and waddle walks                        Re-evaluation      CM     Ther Act/Gait                                Modalities           CP prn.  10'  10'  10'  10'  10'

## 2025-05-21 ENCOUNTER — OFFICE VISIT (OUTPATIENT)
Dept: PHYSICAL THERAPY | Facility: REHABILITATION | Age: 60
End: 2025-05-21
Attending: ORTHOPAEDIC SURGERY
Payer: COMMERCIAL

## 2025-05-21 DIAGNOSIS — M25.561 ACUTE PAIN OF RIGHT KNEE: Primary | ICD-10-CM

## 2025-05-21 DIAGNOSIS — Z96.651 S/P TOTAL KNEE ARTHROPLASTY, RIGHT: ICD-10-CM

## 2025-05-21 PROCEDURE — 97112 NEUROMUSCULAR REEDUCATION: CPT | Performed by: PHYSICAL THERAPIST

## 2025-05-21 PROCEDURE — 97140 MANUAL THERAPY 1/> REGIONS: CPT | Performed by: PHYSICAL THERAPIST

## 2025-05-21 NOTE — PROGRESS NOTES
"Daily Note     Today's date: 2025  Patient name: Senthil Baker  : 1965  MRN: [id number]  Referring provider: Lc Gauthier DO  Dx:   Encounter Diagnosis     ICD-10-CM    1. Acute pain of right knee  M25.561       2. S/P total knee arthroplasty, right  Z96.651           Start Time: 0900  Stop Time: 0955  Total time in clinic (min): 55 minutes    Subjective: Patient reports he is getting better slowly but is still unable to ascend stairs with reciprocal pattern. He is stiff on arrival due to weather and cutting grass yesterday      Objective: See treatment diary below      Assessment: Tolerated treatment well. Increased repetitions with U/L leg press with associated increase in fatigue. Continued ROM deficits into knee flexion. Patient demonstrated fatigue post treatment, exhibited good technique with therapeutic exercises, and would benefit from continued PT      Plan: Continue per plan of care.        Diagnosis: s/p R TKA 25   Precautions: DM, asthma, pacemaker, PE   Primary impairments: R knee AROM/PROM deficits, R knee strength deficits, and gait dysfunction   *asterisks by exercise = given for HEP       Manuals        R knee PROM and tibiofemoral mobilizations  10'  15'  15'  15'   Prone knee flexion  3'  3'  3'  3'                           There Ex        Rec bike  1/2 revs x 8'  1/2 revs x 8'  1/2 revs x 8'  1/2 revs x 8'  1/2 revs x 8'   Heel slides *        Gastroc strap stretch        Supine knee ext prop stretch                                                Neuro Re-Ed        Quad sets *        SAQ         Supine SLR         Bridges        S/L hip abd        Prone hip ext        Sit to stand  Foam x 15  Foam x 10  Foam x 15   Foam x 15   U/L leg press  55 lbs x 20   45 lbs x 15  55 lbs x 15   55lbs x15   Ball terminal leg press  5\" x 15  5\" x 15  5\" x 15   5\" x 15   Fwd/lat step ups  1R x 15 ea  1R x 12 ea  1R x 15 ea   1R x 15 ea   Sidestepping and waddle walks   "                      Re-evaluation      CM     Ther Act/Gait                                Modalities           CP prn.  10'  10'  10'  10'  10'

## 2025-05-23 ENCOUNTER — OFFICE VISIT (OUTPATIENT)
Dept: PHYSICAL THERAPY | Facility: REHABILITATION | Age: 60
End: 2025-05-23
Attending: ORTHOPAEDIC SURGERY
Payer: COMMERCIAL

## 2025-05-23 DIAGNOSIS — Z96.651 S/P TOTAL KNEE ARTHROPLASTY, RIGHT: ICD-10-CM

## 2025-05-23 DIAGNOSIS — M25.561 ACUTE PAIN OF RIGHT KNEE: Primary | ICD-10-CM

## 2025-05-23 PROCEDURE — 97110 THERAPEUTIC EXERCISES: CPT | Performed by: PHYSICAL THERAPIST

## 2025-05-23 PROCEDURE — 97112 NEUROMUSCULAR REEDUCATION: CPT | Performed by: PHYSICAL THERAPIST

## 2025-05-23 PROCEDURE — 97140 MANUAL THERAPY 1/> REGIONS: CPT | Performed by: PHYSICAL THERAPIST

## 2025-05-23 NOTE — PROGRESS NOTES
"Daily Note     Today's date: 2025  Patient name: Senthil Baker  : 1965  MRN: [id number]  Referring provider: Lc Gauthier DO  Dx:   Encounter Diagnosis     ICD-10-CM    1. Acute pain of right knee  M25.561       2. S/P total knee arthroplasty, right  Z96.651           Start Time: 0900  Stop Time: 0955  Total time in clinic (min): 55 minutes    Subjective: Patient reports he is stiff on arrival but he felt looser after last visit for 1.5 days      Objective: See treatment diary below      Assessment: Tolerated treatment well. Initiated sidestepping and waddle walks without resistance. Slightly decreased terminal knee extension with leg press into ball this visit which patient attributes to weather-related soreness. Increased muscle guarding compared to last visit. Patient demonstrated fatigue post treatment, exhibited good technique with therapeutic exercises, and would benefit from continued PT      Plan: Continue per plan of care.        Diagnosis: s/p R TKA 25   Precautions: DM, asthma, pacemaker, PE   Primary impairments: R knee AROM/PROM deficits, R knee strength deficits, and gait dysfunction   *asterisks by exercise = given for HEP     5   Manuals        R knee PROM and tibiofemoral mobilizations  10'  8'  15'  15'   Prone knee flexion  3'  2'  3'  3'   Quadriceps IASTM   5'                      There Ex        Rec bike  1/2 revs x 8'  1/2 revs x 8'  1/2 revs x 8'  1/2 revs x 8'  1/2 revs x 8'   Heel slides *        Gastroc strap stretch        Supine knee ext prop stretch                                                Neuro Re-Ed        Quad sets *        Supine SLR         Bridges        S/L hip abd        Sit to stand *  Foam x 15  HEP  Foam x 15   Foam x 15   U/L leg press  55 lbs x 20   NV  55 lbs x 15   55lbs x15   Ball terminal leg press  5\" x 15  5\" x 15  5\" x 15   5\" x 15   Fwd/lat step ups  1R x 15 ea  1R x 15 ea  1R x 15 ea   1R x 15 ea   Sidestepping and " ishaanle walks   X 1 lap ea                      Re-evaluation      CM     Ther Act/Gait                                Modalities           CP prn.  10'  10'  10'  10'  10'

## 2025-05-28 ENCOUNTER — OFFICE VISIT (OUTPATIENT)
Dept: PHYSICAL THERAPY | Facility: REHABILITATION | Age: 60
End: 2025-05-28
Attending: ORTHOPAEDIC SURGERY
Payer: COMMERCIAL

## 2025-05-28 DIAGNOSIS — Z96.651 S/P TOTAL KNEE ARTHROPLASTY, RIGHT: ICD-10-CM

## 2025-05-28 DIAGNOSIS — M25.561 ACUTE PAIN OF RIGHT KNEE: Primary | ICD-10-CM

## 2025-05-28 PROCEDURE — 97112 NEUROMUSCULAR REEDUCATION: CPT | Performed by: PHYSICAL THERAPIST

## 2025-05-28 PROCEDURE — 97110 THERAPEUTIC EXERCISES: CPT | Performed by: PHYSICAL THERAPIST

## 2025-05-28 PROCEDURE — 97140 MANUAL THERAPY 1/> REGIONS: CPT | Performed by: PHYSICAL THERAPIST

## 2025-05-28 NOTE — PROGRESS NOTES
"Daily Note     Today's date: 2025  Patient name: Senthil Baker  : 1965  MRN: [id number]  Referring provider: Lc Gauthier DO  Dx:   Encounter Diagnosis     ICD-10-CM    1. Acute pain of right knee  M25.561       2. S/P total knee arthroplasty, right  Z96.651           Start Time: 1115  Stop Time: 1210  Total time in clinic (min): 55 minutes    Subjective: Patient reports he has been more sore and stiff over the past week to 10 days and he is unsure why. The sensation is similar to a deep bruise in his medial knee which will not subside      Objective: See treatment diary below      Assessment: Tolerated treatment well. Spent majority of treatment on manual therapy with goal of improving knee mobility. Persistent stiffness remains especially into flexion. Patient demonstrated fatigue post treatment, exhibited good technique with therapeutic exercises, and would benefit from continued PT      Plan: Continue per plan of care.        Diagnosis: s/p R TKA 25   Precautions: DM, asthma, pacemaker, PE   Primary impairments: R knee AROM/PROM deficits, R knee strength deficits, and gait dysfunction   *asterisks by exercise = given for HEP     5   Manuals        R knee PROM and tibiofemoral mobilizations  10'  8'  15'  15'   Prone knee flexion  3'  2'  3'  3'   Quadriceps IASTM   5'  5'                     There Ex        Rec bike  1/2 revs x 8'  1/2 revs x 8'  1/2 revs x 8'  1/2 revs x 8'  1/2 revs x 8'   Heel slides *        Gastroc strap stretch        Supine knee ext prop stretch                                                Neuro Re-Ed        Quad sets *        Supine SLR         Bridges        S/L hip abd        Sit to stand *  Foam x 15  HEP    Foam x 15   U/L leg press  55 lbs x 20   NV    55lbs x15   Ball terminal leg press  5\" x 15  5\" x 15  5\" x 15   5\" x 15   Fwd/lat step ups  1R x 15 ea  1R x 15 ea  1R x 15 ea   1R x 15 ea   Sidestepping and waddle walks   X 1 lap ea    "                   Re-evaluation     CM     Ther Act/Gait                               Modalities           CP prn.  10'  10'  10'  10'  10'

## 2025-05-30 ENCOUNTER — EVALUATION (OUTPATIENT)
Dept: PHYSICAL THERAPY | Facility: REHABILITATION | Age: 60
End: 2025-05-30
Attending: ORTHOPAEDIC SURGERY
Payer: COMMERCIAL

## 2025-05-30 DIAGNOSIS — Z96.651 S/P TOTAL KNEE ARTHROPLASTY, RIGHT: ICD-10-CM

## 2025-05-30 DIAGNOSIS — M25.561 ACUTE PAIN OF RIGHT KNEE: Primary | ICD-10-CM

## 2025-05-30 PROCEDURE — 97110 THERAPEUTIC EXERCISES: CPT | Performed by: PHYSICAL THERAPIST

## 2025-05-30 PROCEDURE — 97112 NEUROMUSCULAR REEDUCATION: CPT | Performed by: PHYSICAL THERAPIST

## 2025-05-30 NOTE — PROGRESS NOTES
PT Re-Evaluation     Today's date: 2025  Patient name: Senthil Baker  : 1965  MRN: [id number]  Referring provider: Lc Gauthier DO  Dx:   Encounter Diagnosis     ICD-10-CM    1. Acute pain of right knee  M25.561       2. S/P total knee arthroplasty, right  Z96.651           Start Time: 0945  Stop Time: 1040  Total time in clinic (min): 55 minutes    Assessment  Impairments: abnormal gait, abnormal muscle firing, abnormal or restricted ROM, abnormal movement, activity intolerance, impaired physical strength, pain with function, poor body mechanics and activity limitations    Assessment details: Patient is a 59 y.o. male presenting s/p R TKA with date of surgery 25. Patient exhibits fair progress toward objective and functional goals at time of reexamination. Patient exhibits improvements with ambulating without AD in most situations, walking dog, knee ROM, knee strength, weightbearing tolerance, and modified yardwork including cutting grass since initiating PT however continues to have limitations compared to prior level of function. Remaining functional limitations include ambulating with SPC for longer distances, longer distance ambulation, heavy lifting, and step to step stair navigation. Persistent limitations remain in knee flexion ROM. As a result of impairments patient has continued limitations with daily and functional activities and would benefit from continued skilled PT interventions to address these impairments in order to maximize function.            Prognosis: good    Goals  Impairment Goals: 4-6 weeks  - Patient to decrease pain to 0/10 - MET  - Patient to improve knee PROM to WFL - PROGRESSING  - Patient to increase knee strength to 4/5 throughout - PROGRESSING  - Patient to increase hip strength to 4/5 throughout - PROGRESSING    Functional Goals: by discharge  - Patient to discharge to independent [facility] - PROGRESSING  - Patient to improve subjective functional level to 85% -  PROGRESSING  - Patient to improve knee AROM to WFL - PROGRESSING  - Patient to ambulate in home and community without increased pain or difficulty - PROGRESSING  - Patient to ascend and descend stairs without increased pain or difficulty - PROGRESSING  - Patient to complete sit to stand transfers without increased pain or difficulty - PROGRESSING    Plan  Patient would benefit from: skilled physical therapy  Planned modality interventions: cryotherapy, TENS and thermotherapy: hydrocollator packs    Planned therapy interventions: flexibility, home exercise program, joint mobilization, manual therapy, neuromuscular re-education, patient education, strengthening, stretching, therapeutic activities, therapeutic exercise and functional ROM exercises    Frequency: 2x week  Duration in weeks: 6  Treatment plan discussed with: patient        Subjective Evaluation    History of Present Illness  Mechanism of injury: HISTORY OF PRESENT ILLNESS: Patient presents s/p R TKA 25. Patient reports he is about the same over the past few weeks with a little progress. He continues to have medial joint effusion and pain. He notes tightness into knee flexion which has been slower to come around. He is ambulating without AD in most situations and has returned to cutting grass with rest periods as needed. He follows up with surgeon next week  PRIOR TREATMENT: home health  AGGRAVATING FACTORS: general soreness  EASING FACTORS: medications  WORK:  (office work)  FUNCTIONAL LIMITATIONS: ambulating with SPC for longer distances, longer distance ambulation, heavy lifting, and step to step stair navigation  IMPROVEMENTS: ambulating without AD in most situations, walking dog, knee ROM, knee strength, weightbearing tolerance, and modified yardwork including cutting grass  SUBJECTIVE FUNCTIONAL LEVEL: 80%  PATIENT GOAL: to get back to normal  Pain  Current pain ratin  At best pain ratin  At worst pain ratin  Location:  "R knee          Objective     Active Range of Motion   Left Knee   Flexion: 145 degrees   Extension: 0 degrees     Right Knee   Flexion: 77 degrees   Extension: -5 degrees     Passive Range of Motion     Right Knee   Flexion: 82 degrees   Extension: -2 degrees     Strength/Myotome Testing     Lumbar   Left   Normal strength    Right Hip   Planes of Motion   Flexion: 4-    Right Knee   Flexion: 3- (4 within ROM)  Extension: 3- (4 within ROM)  Quadriceps contraction: fair    Right Ankle/Foot   Dorsiflexion: 5    Additional Strength Details  R SLR: 3+  L SLR: 4  Extension lag with SLR    Ambulation     Observational Gait     Additional Observational Gait Details  Decreased knee flexion during swing and decreased terminal knee extension               Diagnosis: s/p R TKA 2/19/25   Precautions: DM, asthma, pacemaker, PE   Primary impairments: R knee AROM/PROM deficits, R knee strength deficits, and gait dysfunction   *asterisks by exercise = given for HEP    5/21 5/23 5/28 5/30 5/12   Manuals        R knee PROM seated and tibiofemoral mobilizations  10'  8'  15'  15'   Prone knee flexion  3'  2'  3'  3'   Quadriceps IASTM   5'  5'                     There Ex        Rec bike  1/2 revs x 8'  1/2 revs x 8'  1/2 revs x 8'  1/2 revs x 8'  1/2 revs x 8'   Heel slides *        Seated knee flexion stretch *     Reviewed                                                     Neuro Re-Ed        Quad sets *        Supine SLR         Bridges        S/L hip abd        Sit to stand *  Foam x 15  HEP    Foam x 15   U/L leg press  55 lbs x 20   NV    55lbs x15   Ball terminal leg press  5\" x 15  5\" x 15  5\" x 15   5\" x 15   Fwd/lat step ups  1R x 15 ea  1R x 15 ea  1R x 15 ea   1R x 15 ea   Sidestepping and waddle walks   X 1 lap ea                      Re-evaluation     CM     Ther Act/Gait                               Modalities           CP prn.  10'  10'  10'   10'                                      "

## 2025-05-30 NOTE — LETTER
May 30, 2025    Lc Gauthier DO  [address] [facility] [address]    Patient: Senthil Baker   YOB: 1965   Date of Visit: 2025     Encounter Diagnosis     ICD-10-CM    1. Acute pain of right knee  M25.561       2. S/P total knee arthroplasty, right  Z96.651           Dear Dr. Lc Gauthier, DO:    Thank you for your recent referral of Senthil Baker. Please review the attached evaluation summary from Senthil's recent visit.     Please verify that you agree with the plan of care by signing the attached order.     If you have any questions or concerns, please do not hesitate to call.     I sincerely appreciate the opportunity to share in the care of one of your patients and hope to have another opportunity to work with you in the near future.       Sincerely,    Alejandro Clarke, PT      Referring Provider:      I certify that I have read the below Plan of Care and certify the need for these services furnished under this plan of treatment while under my care.                    Lc Gauthier DO  [address] [facility] [address]  Via Fax: [phone]          PT Re-Evaluation     Today's date: 2025  Patient name: Senthil Baker  : 1965  MRN: [id number]  Referring provider: Lc Gauthier DO  Dx:   Encounter Diagnosis     ICD-10-CM    1. Acute pain of right knee  M25.561       2. S/P total knee arthroplasty, right  Z96.651           Start Time: 0945  Stop Time: 1040  Total time in clinic (min): 55 minutes    Assessment  Impairments: abnormal gait, abnormal muscle firing, abnormal or restricted ROM, abnormal movement, activity intolerance, impaired physical strength, pain with function, poor body mechanics and activity limitations    Assessment details: Patient is a 59 y.o. male presenting s/p R TKA with date of surgery 25. Patient exhibits fair progress toward objective and functional goals at time of reexamination. Patient exhibits improvements with  ambulating without AD in most situations, walking dog, knee ROM, knee strength, weightbearing tolerance, and modified yardwork including cutting grass since initiating PT however continues to have limitations compared to prior level of function. Remaining functional limitations include ambulating with SPC for longer distances, longer distance ambulation, heavy lifting, and step to step stair navigation. Persistent limitations remain in knee flexion ROM. As a result of impairments patient has continued limitations with daily and functional activities and would benefit from continued skilled PT interventions to address these impairments in order to maximize function.            Prognosis: good    Goals  Impairment Goals: 4-6 weeks  - Patient to decrease pain to 0/10 - MET  - Patient to improve knee PROM to WFL - PROGRESSING  - Patient to increase knee strength to 4/5 throughout - PROGRESSING  - Patient to increase hip strength to 4/5 throughout - PROGRESSING    Functional Goals: by discharge  - Patient to discharge to independent [facility] - PROGRESSING  - Patient to improve subjective functional level to 85% - PROGRESSING  - Patient to improve knee AROM to WFL - PROGRESSING  - Patient to ambulate in home and community without increased pain or difficulty - PROGRESSING  - Patient to ascend and descend stairs without increased pain or difficulty - PROGRESSING  - Patient to complete sit to stand transfers without increased pain or difficulty - PROGRESSING    Plan  Patient would benefit from: skilled physical therapy  Planned modality interventions: cryotherapy, TENS and thermotherapy: hydrocollator packs    Planned therapy interventions: flexibility, home exercise program, joint mobilization, manual therapy, neuromuscular re-education, patient education, strengthening, stretching, therapeutic activities, therapeutic exercise and functional ROM exercises    Frequency: 2x week  Duration in weeks: 6  Treatment plan discussed  with: patient        Subjective Evaluation    History of Present Illness  Mechanism of injury: HISTORY OF PRESENT ILLNESS: Patient presents s/p R TKA 25. Patient reports he is about the same over the past few weeks with a little progress. He continues to have medial joint effusion and pain. He notes tightness into knee flexion which has been slower to come around. He is ambulating without AD in most situations and has returned to cutting grass with rest periods as needed. He follows up with surgeon next week  PRIOR TREATMENT: home health  AGGRAVATING FACTORS: general soreness  EASING FACTORS: medications  WORK:  (office work)  FUNCTIONAL LIMITATIONS: ambulating with SPC for longer distances, longer distance ambulation, heavy lifting, and step to step stair navigation  IMPROVEMENTS: ambulating without AD in most situations, walking dog, knee ROM, knee strength, weightbearing tolerance, and modified yardwork including cutting grass  SUBJECTIVE FUNCTIONAL LEVEL: 80%  PATIENT GOAL: to get back to normal  Pain  Current pain ratin  At best pain ratin  At worst pain ratin  Location: R knee          Objective     Active Range of Motion   Left Knee   Flexion: 145 degrees   Extension: 0 degrees     Right Knee   Flexion: 77 degrees   Extension: -5 degrees     Passive Range of Motion     Right Knee   Flexion: 82 degrees   Extension: -2 degrees     Strength/Myotome Testing     Lumbar   Left   Normal strength    Right Hip   Planes of Motion   Flexion: 4-    Right Knee   Flexion: 3- (4 within ROM)  Extension: 3- (4 within ROM)  Quadriceps contraction: fair    Right Ankle/Foot   Dorsiflexion: 5    Additional Strength Details  R SLR: 3+  L SLR: 4  Extension lag with SLR    Ambulation     Observational Gait     Additional Observational Gait Details  Decreased knee flexion during swing and decreased terminal knee extension               Diagnosis: s/p R TKA 25   Precautions: DM, asthma, pacemaker,  "PE   Primary impairments: R knee AROM/PROM deficits, R knee strength deficits, and gait dysfunction   *asterisks by exercise = given for HEP    5/21 5/23 5/28 5/30 5/12   Manuals        R knee PROM seated and tibiofemoral mobilizations  10'  8'  15'  15'   Prone knee flexion  3'  2'  3'  3'   Quadriceps IASTM   5'  5'                     There Ex        Rec bike  1/2 revs x 8'  1/2 revs x 8'  1/2 revs x 8'  1/2 revs x 8'  1/2 revs x 8'   Heel slides *        Seated knee flexion stretch *     Reviewed                                                     Neuro Re-Ed        Quad sets *        Supine SLR         Bridges        S/L hip abd        Sit to stand *  Foam x 15  HEP    Foam x 15   U/L leg press  55 lbs x 20   NV    55lbs x15   Ball terminal leg press  5\" x 15  5\" x 15  5\" x 15   5\" x 15   Fwd/lat step ups  1R x 15 ea  1R x 15 ea  1R x 15 ea   1R x 15 ea   Sidestepping and waddle walks   X 1 lap ea                      Re-evaluation     CM     Ther Act/Gait                               Modalities           CP prn.  10'  10'  10'   10'                                                      "

## 2025-06-03 ENCOUNTER — OFFICE VISIT (OUTPATIENT)
Dept: PHYSICAL THERAPY | Facility: REHABILITATION | Age: 60
End: 2025-06-03
Attending: ORTHOPAEDIC SURGERY
Payer: COMMERCIAL

## 2025-06-03 DIAGNOSIS — M25.561 ACUTE PAIN OF RIGHT KNEE: Primary | ICD-10-CM

## 2025-06-03 DIAGNOSIS — Z96.651 S/P TOTAL KNEE ARTHROPLASTY, RIGHT: ICD-10-CM

## 2025-06-03 PROCEDURE — 97112 NEUROMUSCULAR REEDUCATION: CPT | Performed by: PHYSICAL THERAPIST

## 2025-06-03 PROCEDURE — 97110 THERAPEUTIC EXERCISES: CPT | Performed by: PHYSICAL THERAPIST

## 2025-06-03 PROCEDURE — 97140 MANUAL THERAPY 1/> REGIONS: CPT | Performed by: PHYSICAL THERAPIST

## 2025-06-03 NOTE — PROGRESS NOTES
"Daily Note     Today's date: 6/3/2025  Patient name: Senthil Baker  : 1965  MRN: [id number]  Referring provider: Lc Gauthier DO  Dx:   Encounter Diagnosis     ICD-10-CM    1. Acute pain of right knee  M25.561       2. S/P total knee arthroplasty, right  Z96.651           Start Time: 0900  Stop Time: 0953  Total time in clinic (min): 53 minutes    Subjective: Patient follows up with his surgeon later today. He completed the flexion stretches at home over the weekend       Objective: See treatment diary below      Assessment: Tolerated treatment well. Initiated forward step overs with difficulty controlling descent due to limited knee flexion. Resumed unilateral leg press with improving control noted. Completed manual therapy in seated position with reduced muscle guarding compared to supine. Patient demonstrated fatigue post treatment, exhibited good technique with therapeutic exercises, and would benefit from continued PT      Plan: Continue per plan of care.        Diagnosis: s/p R TKA 25   Precautions: DM, asthma, pacemaker, PE   Primary impairments: R knee AROM/PROM deficits, R knee strength deficits, and gait dysfunction   *asterisks by exercise = given for HEP     6/3   Manuals        R knee PROM seated and tibiofemoral mobilizations  10'  8'  15'   15'   Prone knee flexion  3'  2'  3'   3'   Quadriceps IASTM   5'  5'                     There Ex        Rec bike  1/2 revs x 8'  1/2 revs x 8'  1/2 revs x 8'  1/2 revs x 8'  1/2 revs x 8'   Heel slides *        Seated knee flexion stretch *     Reviewed                                                     Neuro Re-Ed        Quad sets *        Supine SLR         Bridges        S/L hip abd        Sit to stand *  Foam x 15  HEP      U/L leg press  55 lbs x 20   NV    55 lbs x 20   Ball terminal leg press  5\" x 15  5\" x 15  5\" x 15   5\" x 15   Fwd step overs      1R x 15   Sidestepping and waddle walks   X 1 lap ea                   "    Re-evaluation     CM    Ther Act/Gait                            Modalities           CP prn.  10'  10'  10'   10'

## 2025-06-05 ENCOUNTER — OFFICE VISIT (OUTPATIENT)
Dept: PHYSICAL THERAPY | Facility: REHABILITATION | Age: 60
End: 2025-06-05
Attending: ORTHOPAEDIC SURGERY
Payer: COMMERCIAL

## 2025-06-05 DIAGNOSIS — M25.561 ACUTE PAIN OF RIGHT KNEE: Primary | ICD-10-CM

## 2025-06-05 DIAGNOSIS — Z96.651 S/P TOTAL KNEE ARTHROPLASTY, RIGHT: ICD-10-CM

## 2025-06-05 PROCEDURE — 97140 MANUAL THERAPY 1/> REGIONS: CPT | Performed by: PHYSICAL THERAPIST

## 2025-06-05 PROCEDURE — 97110 THERAPEUTIC EXERCISES: CPT | Performed by: PHYSICAL THERAPIST

## 2025-06-05 PROCEDURE — 97112 NEUROMUSCULAR REEDUCATION: CPT | Performed by: PHYSICAL THERAPIST

## 2025-06-05 NOTE — PROGRESS NOTES
"Daily Note     Today's date: 2025  Patient name: Senthil Baker  : 1965  MRN: [id number]  Referring provider: Lc Gauthier DO  Dx:   Encounter Diagnosis     ICD-10-CM    1. Acute pain of right knee  M25.561       2. S/P total knee arthroplasty, right  Z96.651           Start Time: 0945  Stop Time: 1040  Total time in clinic (min): 55 minutes    Subjective: Patient reports he saw the surgeon who assured him that his function will improve over time and the hardware looks good      Objective: See treatment diary below      Assessment: Tolerated treatment well. Gradually improving terminal knee extension with leg press into ball. Limitations remain in knee flexion however patient continues to tolerate manual therapy in seated well with reduced muscle guarding. Patient demonstrated fatigue post treatment, exhibited good technique with therapeutic exercises, and would benefit from continued PT      Plan: Continue per plan of care.        Diagnosis: s/p R TKA 25   Precautions: DM, asthma, pacemaker, PE   Primary impairments: R knee AROM/PROM deficits, R knee strength deficits, and gait dysfunction   *asterisks by exercise = given for HEP     6/3   Manuals        R knee PROM seated and tibiofemoral mobilizations  15'  8'  15'   15'   Prone knee flexion  3'  2'  3'   3'   Quadriceps IASTM   5'  5'                     There Ex        Rec bike  1/2 revs x 8'  1/2 revs x 8'  1/2 revs x 8'  1/2 revs x 8'  1/2 revs x 8'   Heel slides *        Seated knee flexion stretch *     Reviewed                                                     Neuro Re-Ed        Quad sets *        Supine SLR         Bridges        S/L hip abd        Sit to stand *   HEP      U/L leg press  55 lbs x 20  NV    55 lbs x 20   Ball terminal leg press  5\" x 15  5\" x 15  5\" x 15   5\" x 15   Fwd step overs  1R x 15     1R x 15   Sidestepping and waddle walks                        Re-evaluation     CM    Ther Act/Gait      "                       Modalities           CP prn.  10'  10'  10'   10'

## 2025-06-10 ENCOUNTER — OFFICE VISIT (OUTPATIENT)
Dept: PHYSICAL THERAPY | Facility: REHABILITATION | Age: 60
End: 2025-06-10
Attending: ORTHOPAEDIC SURGERY
Payer: COMMERCIAL

## 2025-06-10 DIAGNOSIS — M25.561 ACUTE PAIN OF RIGHT KNEE: Primary | ICD-10-CM

## 2025-06-10 DIAGNOSIS — Z96.651 S/P TOTAL KNEE ARTHROPLASTY, RIGHT: ICD-10-CM

## 2025-06-10 PROCEDURE — 97140 MANUAL THERAPY 1/> REGIONS: CPT | Performed by: PHYSICAL THERAPIST

## 2025-06-10 PROCEDURE — 97112 NEUROMUSCULAR REEDUCATION: CPT | Performed by: PHYSICAL THERAPIST

## 2025-06-10 PROCEDURE — 97110 THERAPEUTIC EXERCISES: CPT | Performed by: PHYSICAL THERAPIST

## 2025-06-10 NOTE — PROGRESS NOTES
"Daily Note     Today's date: 6/10/2025  Patient name: Senthil Baker  : 1965  MRN: [id number]  Referring provider: Lc Gauthier DO  Dx:   Encounter Diagnosis     ICD-10-CM    1. Acute pain of right knee  M25.561       2. S/P total knee arthroplasty, right  Z96.651           Start Time: 0815  Stop Time: 09  Total time in clinic (min): 49 minutes    Subjective: Patient reports he is stiff on arrival likely due to the weather but he felt good all day yesterday      Objective: See treatment diary below      Assessment: Tolerated treatment well. Increased resistance with unilateral leg press with no adverse responses. Gradually improving knee flexion PROM with manual therapy. Patient continues to respond well to seated positioning during manual therapy. Patient demonstrated fatigue post treatment, exhibited good technique with therapeutic exercises, and would benefit from continued PT      Plan: Continue per plan of care.        Diagnosis: s/p R TKA 25   Precautions: DM, asthma, pacemaker, PE   Primary impairments: R knee AROM/PROM deficits, R knee strength deficits, and gait dysfunction   *asterisks by exercise = given for HEP    6/5 6/10 5/28 5/30 6/3   Manuals        R knee PROM seated and tibiofemoral mobilizations  15'  15'  15'   15'   Prone knee flexion  3'  3'  3'   3'   Quadriceps IASTM    5'                     There Ex        Rec bike  1/2 revs x 8'  1/2 revs x 8'  1/2 revs x 8'  1/2 revs x 8'  1/2 revs x 8'   Heel slides *        Seated knee flexion stretch *     Reviewed                                                     Neuro Re-Ed        Quad sets *        Supine SLR         Bridges        S/L hip abd        Sit to stand *        U/L leg press  55 lbs x 20  65 lbs x 15    55 lbs x 20   Ball terminal leg press  5\" x 15  5\" x 15  5\" x 15   5\" x 15   Fwd step overs  1R x 15  1R x 15    1R x 15   Sidestepping and waddle walks                        Re-evaluation     CM    Ther Act/Gait      "                       Modalities           CP prn.  10'  10'  10'   10'

## 2025-06-12 ENCOUNTER — OFFICE VISIT (OUTPATIENT)
Dept: PHYSICAL THERAPY | Facility: REHABILITATION | Age: 60
End: 2025-06-12
Attending: ORTHOPAEDIC SURGERY
Payer: COMMERCIAL

## 2025-06-12 DIAGNOSIS — Z96.651 S/P TOTAL KNEE ARTHROPLASTY, RIGHT: ICD-10-CM

## 2025-06-12 DIAGNOSIS — M25.561 ACUTE PAIN OF RIGHT KNEE: Primary | ICD-10-CM

## 2025-06-12 PROCEDURE — 97110 THERAPEUTIC EXERCISES: CPT | Performed by: PHYSICAL THERAPIST

## 2025-06-12 PROCEDURE — 97112 NEUROMUSCULAR REEDUCATION: CPT | Performed by: PHYSICAL THERAPIST

## 2025-06-12 PROCEDURE — 97140 MANUAL THERAPY 1/> REGIONS: CPT | Performed by: PHYSICAL THERAPIST

## 2025-06-12 NOTE — PROGRESS NOTES
"Daily Note     Today's date: 2025  Patient name: Senthil Baker  : 1965  MRN: [id number]  Referring provider: Lc Gauthier DO  Dx:   Encounter Diagnosis     ICD-10-CM    1. Acute pain of right knee  M25.561       2. S/P total knee arthroplasty, right  Z96.651           Start Time: 0900  Stop Time: 0955  Total time in clinic (min): 55 minutes    Subjective: Patient reports he continues to have tightness and stiffness and is slowly progressing      Objective: See treatment diary below      Assessment: Tolerated treatment well. Improving terminal knee extension with leg press into ball. Gradually improving knee flexion ROM however notable limitations remain. Patient responds well to manual therapy in seated position with reduced muscle guarding compared to supine. Patient demonstrated fatigue post treatment, exhibited good technique with therapeutic exercises, and would benefit from continued PT      Plan: Continue per plan of care.        Diagnosis: s/p R TKA 25   Precautions: DM, asthma, pacemaker, PE   Primary impairments: R knee AROM/PROM deficits, R knee strength deficits, and gait dysfunction   *asterisks by exercise = given for HEP    6/5 6/10 6/12 5/30 6/3   Manuals        R knee PROM seated and tibiofemoral mobilizations  15'  15'  12'   15'   Prone knee flexion  3'  3'  3'   3'   Quadriceps IASTM                        There Ex        Rec bike  1/2 revs x 8'  1/2 revs x 8'  1/2 revs x 8'  1/2 revs x 8'  1/2 revs x 8'   Heel slides *        Seated knee flexion stretch *     Reviewed                                                     Neuro Re-Ed        Quad sets *        Supine SLR         Bridges        S/L hip abd        Sit to stand *        U/L leg press  55 lbs x 20  65 lbs x 15  65 lbs x 15   55 lbs x 20   Ball terminal leg press  5\" x 15  5\" x 15  5\" x 15   5\" x 15   Fwd step overs  1R x 15  1R x 15  1R x 15   1R x 15   Sidestepping and waddle walks                      "   Re-evaluation     CM    Ther Act/Gait                            Modalities           CP prn.  10'  10'  10'   10'

## 2025-06-20 ENCOUNTER — APPOINTMENT (OUTPATIENT)
Dept: PHYSICAL THERAPY | Facility: REHABILITATION | Age: 60
End: 2025-06-20
Attending: ORTHOPAEDIC SURGERY
Payer: COMMERCIAL

## 2025-06-26 ENCOUNTER — EVALUATION (OUTPATIENT)
Dept: PHYSICAL THERAPY | Facility: REHABILITATION | Age: 60
End: 2025-06-26
Attending: ORTHOPAEDIC SURGERY
Payer: COMMERCIAL

## 2025-06-26 DIAGNOSIS — M25.561 ACUTE PAIN OF RIGHT KNEE: Primary | ICD-10-CM

## 2025-06-26 DIAGNOSIS — Z96.651 S/P TOTAL KNEE ARTHROPLASTY, RIGHT: ICD-10-CM

## 2025-06-26 PROCEDURE — 97110 THERAPEUTIC EXERCISES: CPT | Performed by: PHYSICAL THERAPIST

## 2025-06-26 PROCEDURE — 97112 NEUROMUSCULAR REEDUCATION: CPT | Performed by: PHYSICAL THERAPIST

## 2025-06-26 NOTE — LETTER
2025    Lc Gauthier DO  [address] [facility] [address]    Patient: Senthil Baker   YOB: 1965   Date of Visit: 2025     Encounter Diagnosis     ICD-10-CM    1. Acute pain of right knee  M25.561       2. S/P total knee arthroplasty, right  Z96.651           Dear Dr. Lc Gauthier, DO:    Thank you for your recent referral of Senthil Baker. Please review the attached evaluation summary from Senthil's recent visit.     Please verify that you agree with the plan of care by signing the attached order.     If you have any questions or concerns, please do not hesitate to call.     I sincerely appreciate the opportunity to share in the care of one of your patients and hope to have another opportunity to work with you in the near future.       Sincerely,    Alejandro Clarke, PT      Referring Provider:      I certify that I have read the below Plan of Care and certify the need for these services furnished under this plan of treatment while under my care.                    Lc Gauthier DO  [address] [facility] [address]  Via Fax: [phone]          PT Re-Evaluation     Today's date: 2025  Patient name: Senthil Baker  : 1965  MRN: [id number]  Referring provider: Lc Gauthier DO  Dx:   Encounter Diagnosis     ICD-10-CM    1. Acute pain of right knee  M25.561       2. S/P total knee arthroplasty, right  Z96.651           Start Time: 0815  Stop Time: 09  Total time in clinic (min): 50 minutes    Assessment  Impairments: abnormal gait, abnormal muscle firing, abnormal or restricted ROM, abnormal movement, activity intolerance, impaired physical strength, pain with function, poor body mechanics and activity limitations    Assessment details: Patient is a 59 y.o. male presenting s/p R TKA with date of surgery 25. Patient exhibits fair progress toward objective and functional goals at time of reexamination however has persistent knee flexion  mobility limitations. Patient exhibits improvements with ambulating without AD in home and community, walking dog, knee ROM, knee strength, weightbearing tolerance, and fewer breaks with cutting grass since initiating PT however continues to have limitations compared to prior level of function. Remaining functional limitations include longer distance ambulation, heavy lifting, step to step stair navigation, and out of work. As a result of impairments patient has continued limitations with daily and functional activities and would benefit from continued skilled PT interventions to address these impairments in order to maximize function.        Prognosis: good    Goals  Impairment Goals: 4-6 weeks  - Patient to decrease pain to 0/10 - MET  - Patient to improve knee PROM to WFL - PROGRESSING  - Patient to increase knee strength to 4/5 throughout - MET  - Patient to increase hip strength to 4/5 throughout - MET    Functional Goals: by discharge  - Patient to discharge to independent [facility] - PROGRESSING  - Patient to improve subjective functional level to 85% - PROGRESSING  - Patient to improve knee AROM to WFL - PROGRESSING  - Patient to ambulate in home and community without increased pain or difficulty - PROGRESSING  - Patient to ascend and descend stairs without increased pain or difficulty - PROGRESSING  - Patient to complete sit to stand transfers without increased pain or difficulty - MET    Plan  Patient would benefit from: skilled physical therapy  Planned modality interventions: cryotherapy, TENS and thermotherapy: hydrocollator packs    Planned therapy interventions: flexibility, home exercise program, joint mobilization, manual therapy, neuromuscular re-education, patient education, strengthening, stretching, therapeutic activities, therapeutic exercise and functional ROM exercises    Frequency: 1x week  Duration in weeks: 6  Treatment plan discussed with: patient        Subjective Evaluation    History of  Present Illness  Mechanism of injury: HISTORY OF PRESENT ILLNESS: Patient presents s/p R TKA 25. Patient reports he is slowly getting better since his prior reassessment. He is frustrated with the gradual pace of the progress. He continues to experience medial joint effusion and stiffness with persistent tightness into knee flexion. He is navigating stairs step to step.   PRIOR TREATMENT: home health  AGGRAVATING FACTORS: general soreness, prolonged positioning  EASING FACTORS: medications  WORK:  (office work)  FUNCTIONAL LIMITATIONS: longer distance ambulation, heavy lifting, step to step stair navigation, and out of work  IMPROVEMENTS: ambulating without AD in home and community, walking dog, knee ROM, knee strength, weightbearing tolerance, and fewer breaks with cutting grass  SUBJECTIVE FUNCTIONAL LEVEL: 80%  PATIENT GOAL: to get back to normal  Pain  Current pain ratin  At best pain ratin  At worst pain rating: 3  Location: R knee          Objective     Active Range of Motion   Left Knee   Flexion: 145 degrees   Extension: 0 degrees     Right Knee   Flexion: 80 degrees   Extension: -5 degrees     Passive Range of Motion     Right Knee   Flexion: 94 degrees   Extension: -2 degrees     Strength/Myotome Testing     Lumbar   Left   Normal strength    Right Hip   Planes of Motion   Flexion: 4+  Abduction: 4+    Right Knee   Flexion: 3- (4 within ROM)  Extension: 3- (4 within ROM)  Quadriceps contraction: good    Right Ankle/Foot   Dorsiflexion: 5    Additional Strength Details  R SLR: 4  L SLR: 4  Extension lag with SLR    Ambulation     Observational Gait     Additional Observational Gait Details  Decreased knee flexion during swing               Diagnosis: s/p R TKA 25   Precautions: DM, asthma, pacemaker, PE   Primary impairments: R knee AROM/PROM deficits, R knee strength deficits, and gait dysfunction   *asterisks by exercise = given for HEP    6/5 6/10 6/12 6/26 6/3   Manuals   "      R knee PROM seated and tibiofemoral mobilizations  15'  15'  12'   15'   Prone knee flexion  3'  3'  3'   3'   Quadriceps IASTM                        There Ex        Rec bike  1/2 revs x 8'  1/2 revs x 8'  1/2 revs x 8'  1/2 revs x 8'  1/2 revs x 8'   Heel slides *        Seated knee flexion stretch *                                                        Neuro Re-Ed        Quad sets *        Supine SLR         Bridges        S/L hip abd        Sit to stand *        U/L leg press  55 lbs x 20  65 lbs x 15  65 lbs x 15   55 lbs x 20   Ball terminal leg press  5\" x 15  5\" x 15  5\" x 15   5\" x 15   Fwd step overs  1R x 15  1R x 15  1R x 15   1R x 15   Sidestepping and waddle walks                        Re-evaluation     CM    Ther Act/Gait                            Modalities           CP prn.  10'  10'  10'  10'  10'                                                      "

## 2025-06-26 NOTE — PROGRESS NOTES
PT Re-Evaluation     Today's date: 2025  Patient name: Senthil Baker  : 1965  MRN: [id number]  Referring provider: Lc Gauthier DO  Dx:   Encounter Diagnosis     ICD-10-CM    1. Acute pain of right knee  M25.561       2. S/P total knee arthroplasty, right  Z96.651           Start Time: 0815  Stop Time: 09  Total time in clinic (min): 50 minutes    Assessment  Impairments: abnormal gait, abnormal muscle firing, abnormal or restricted ROM, abnormal movement, activity intolerance, impaired physical strength, pain with function, poor body mechanics and activity limitations    Assessment details: Patient is a 59 y.o. male presenting s/p R TKA with date of surgery 25. Patient exhibits fair progress toward objective and functional goals at time of reexamination however has persistent knee flexion mobility limitations. Patient exhibits improvements with ambulating without AD in home and community, walking dog, knee ROM, knee strength, weightbearing tolerance, and fewer breaks with cutting grass since initiating PT however continues to have limitations compared to prior level of function. Remaining functional limitations include longer distance ambulation, heavy lifting, step to step stair navigation, and out of work. As a result of impairments patient has continued limitations with daily and functional activities and would benefit from continued skilled PT interventions to address these impairments in order to maximize function.        Prognosis: good    Goals  Impairment Goals: 4-6 weeks  - Patient to decrease pain to 0/10 - MET  - Patient to improve knee PROM to WFL - PROGRESSING  - Patient to increase knee strength to 4/5 throughout - MET  - Patient to increase hip strength to 4/5 throughout - MET    Functional Goals: by discharge  - Patient to discharge to independent [facility] - PROGRESSING  - Patient to improve subjective functional level to 85% - PROGRESSING  - Patient to improve knee AROM to WFL -  PROGRESSING  - Patient to ambulate in home and community without increased pain or difficulty - PROGRESSING  - Patient to ascend and descend stairs without increased pain or difficulty - PROGRESSING  - Patient to complete sit to stand transfers without increased pain or difficulty - MET    Plan  Patient would benefit from: skilled physical therapy  Planned modality interventions: cryotherapy, TENS and thermotherapy: hydrocollator packs    Planned therapy interventions: flexibility, home exercise program, joint mobilization, manual therapy, neuromuscular re-education, patient education, strengthening, stretching, therapeutic activities, therapeutic exercise and functional ROM exercises    Frequency: 1x week  Duration in weeks: 6  Treatment plan discussed with: patient        Subjective Evaluation    History of Present Illness  Mechanism of injury: HISTORY OF PRESENT ILLNESS: Patient presents s/p R TKA 25. Patient reports he is slowly getting better since his prior reassessment. He is frustrated with the gradual pace of the progress. He continues to experience medial joint effusion and stiffness with persistent tightness into knee flexion. He is navigating stairs step to step.   PRIOR TREATMENT: home health  AGGRAVATING FACTORS: general soreness, prolonged positioning  EASING FACTORS: medications  WORK:  (office work)  FUNCTIONAL LIMITATIONS: longer distance ambulation, heavy lifting, step to step stair navigation, and out of work  IMPROVEMENTS: ambulating without AD in home and community, walking dog, knee ROM, knee strength, weightbearing tolerance, and fewer breaks with cutting grass  SUBJECTIVE FUNCTIONAL LEVEL: 80%  PATIENT GOAL: to get back to normal  Pain  Current pain ratin  At best pain ratin  At worst pain rating: 3  Location: R knee          Objective     Active Range of Motion   Left Knee   Flexion: 145 degrees   Extension: 0 degrees     Right Knee   Flexion: 80 degrees  "  Extension: -5 degrees     Passive Range of Motion     Right Knee   Flexion: 94 degrees   Extension: -2 degrees     Strength/Myotome Testing     Lumbar   Left   Normal strength    Right Hip   Planes of Motion   Flexion: 4+  Abduction: 4+    Right Knee   Flexion: 3- (4 within ROM)  Extension: 3- (4 within ROM)  Quadriceps contraction: good    Right Ankle/Foot   Dorsiflexion: 5    Additional Strength Details  R SLR: 4  L SLR: 4  Extension lag with SLR    Ambulation     Observational Gait     Additional Observational Gait Details  Decreased knee flexion during swing               Diagnosis: s/p R TKA 2/19/25   Precautions: DM, asthma, pacemaker, PE   Primary impairments: R knee AROM/PROM deficits, R knee strength deficits, and gait dysfunction   *asterisks by exercise = given for HEP    6/5 6/10 6/12 6/26 6/3   Manuals        R knee PROM seated and tibiofemoral mobilizations  15'  15'  12'   15'   Prone knee flexion  3'  3'  3'   3'   Quadriceps IASTM                        There Ex        Rec bike  1/2 revs x 8'  1/2 revs x 8'  1/2 revs x 8'  1/2 revs x 8'  1/2 revs x 8'   Heel slides *        Seated knee flexion stretch *                                                        Neuro Re-Ed        Quad sets *        Supine SLR         Bridges        S/L hip abd        Sit to stand *        U/L leg press  55 lbs x 20  65 lbs x 15  65 lbs x 15   55 lbs x 20   Ball terminal leg press  5\" x 15  5\" x 15  5\" x 15   5\" x 15   Fwd step overs  1R x 15  1R x 15  1R x 15   1R x 15   Sidestepping and waddle walks                        Re-evaluation     CM    Ther Act/Gait                            Modalities           CP prn.  10'  10'  10'  10'  10'                                      "

## 2025-07-01 ENCOUNTER — OFFICE VISIT (OUTPATIENT)
Dept: PHYSICAL THERAPY | Facility: REHABILITATION | Age: 60
End: 2025-07-01
Attending: ORTHOPAEDIC SURGERY
Payer: COMMERCIAL

## 2025-07-01 DIAGNOSIS — M25.561 ACUTE PAIN OF RIGHT KNEE: Primary | ICD-10-CM

## 2025-07-01 DIAGNOSIS — Z96.651 S/P TOTAL KNEE ARTHROPLASTY, RIGHT: ICD-10-CM

## 2025-07-01 PROCEDURE — 97110 THERAPEUTIC EXERCISES: CPT | Performed by: PHYSICAL THERAPIST

## 2025-07-01 PROCEDURE — 97140 MANUAL THERAPY 1/> REGIONS: CPT | Performed by: PHYSICAL THERAPIST

## 2025-07-01 PROCEDURE — 97112 NEUROMUSCULAR REEDUCATION: CPT | Performed by: PHYSICAL THERAPIST

## 2025-07-11 ENCOUNTER — OFFICE VISIT (OUTPATIENT)
Dept: PHYSICAL THERAPY | Facility: REHABILITATION | Age: 60
End: 2025-07-11
Attending: ORTHOPAEDIC SURGERY
Payer: COMMERCIAL

## 2025-07-11 DIAGNOSIS — M25.561 ACUTE PAIN OF RIGHT KNEE: Primary | ICD-10-CM

## 2025-07-11 DIAGNOSIS — Z96.651 S/P TOTAL KNEE ARTHROPLASTY, RIGHT: ICD-10-CM

## 2025-07-11 PROCEDURE — 97140 MANUAL THERAPY 1/> REGIONS: CPT | Performed by: PHYSICAL THERAPIST

## 2025-07-11 PROCEDURE — 97110 THERAPEUTIC EXERCISES: CPT | Performed by: PHYSICAL THERAPIST

## 2025-07-11 PROCEDURE — 97112 NEUROMUSCULAR REEDUCATION: CPT | Performed by: PHYSICAL THERAPIST

## 2025-07-11 NOTE — PROGRESS NOTES
"Daily Note     Today's date: 2025  Patient name: Senthil Baker  : 1965  MRN: [id number]  Referring provider: Lc Gauthier DO  Dx:   Encounter Diagnosis     ICD-10-CM    1. Acute pain of right knee  M25.561       2. S/P total knee arthroplasty, right  Z96.651           Start Time: 0815  Stop Time: 0910  Total time in clinic (min): 55 minutes    Subjective: Patient reports he has been doing okay but continues to have tightness into his knee bending. He has to take breaks with yardwork      Objective: See treatment diary below      Assessment: Tolerated treatment well however persistent limitations into knee flexion ROM appreciated. Patient responds better to seated knee flexion stretching versus supine with reduced muscle guarding. Patient demonstrated fatigue post treatment, exhibited good technique with therapeutic exercises, and would benefit from continued PT      Plan: Continue per plan of care.        Diagnosis: s/p R TKA 25   Precautions: DM, asthma, pacemaker, PE   Primary impairments: R knee AROM/PROM deficits, R knee strength deficits, and gait dysfunction   *asterisks by exercise = given for HEP    7/11 6/10 6/12 6/26 7/1   Manuals        R knee PROM seated and tibiofemoral mobilizations  15'  15'  12'   12'   Prone knee flexion  3'  3'  3'   3'   Quadriceps IASTM                        There Ex        Rec bike  1/2 revs x 8'  1/2 revs x 8'  1/2 revs x 8'  1/2 revs x 8'  1/2 revs x 8'   Heel slides *        Seated knee flexion stretch *                                                        Neuro Re-Ed        Quad sets *        Supine SLR         Bridges        S/L hip abd        Sit to stand *        U/L leg press  65 lbs x 20  65 lbs x 15  65 lbs x 15   65 lbs x 15   Ball terminal leg press  5\" x 15  5\" x 15  5\" x 15   5\" x 15   Fwd step overs  1R x 15  1R x 15  1R x 15   1R x 15    SLS fingertip support      15\" x 3                   Re-evaluation     CM    Ther Act/Gait             "                Modalities           CP prn.  10'  10'  10'  10'  10'

## 2025-07-17 ENCOUNTER — APPOINTMENT (OUTPATIENT)
Dept: PHYSICAL THERAPY | Facility: REHABILITATION | Age: 60
End: 2025-07-17
Attending: ORTHOPAEDIC SURGERY
Payer: COMMERCIAL

## 2025-07-24 ENCOUNTER — APPOINTMENT (OUTPATIENT)
Dept: PHYSICAL THERAPY | Facility: REHABILITATION | Age: 60
End: 2025-07-24
Attending: ORTHOPAEDIC SURGERY
Payer: COMMERCIAL